# Patient Record
Sex: FEMALE | Race: WHITE | Employment: UNEMPLOYED | URBAN - METROPOLITAN AREA
[De-identification: names, ages, dates, MRNs, and addresses within clinical notes are randomized per-mention and may not be internally consistent; named-entity substitution may affect disease eponyms.]

---

## 2022-10-24 DIAGNOSIS — Z86.010 PERSONAL HISTORY OF COLONIC POLYPS: ICD-10-CM

## 2022-10-24 DIAGNOSIS — Z12.11 COLON CANCER SCREENING: Primary | ICD-10-CM

## 2022-10-24 NOTE — TELEPHONE ENCOUNTER
Returned call to patient she is due for her 5 yr repeat  colon chart updated from [facility]. YAHAIRA

## 2022-10-24 NOTE — TELEPHONE ENCOUNTER
----- Message from Migdalia Oates sent at 10/12/2022  4:10 PM CDT -----  Regarding: FW: Colonoscopy  Contact: patient    ----- Message -----  From: Niharika Rutherford  Sent: 10/12/2022   1:35 PM CDT  To: [facility] Staff  Subject: Colonoscopy                                      Per phone call with patient, she would like to schedule for an colonoscopy to be done.  Please return call at [phone] (home).    Thanks,  SJ

## 2022-11-08 VITALS — WEIGHT: 220 LBS | HEIGHT: 68 IN | BODY MASS INDEX: 33.34 KG/M2

## 2022-11-08 RX ORDER — AMLODIPINE BESYLATE 5 MG/1
5 TABLET ORAL DAILY
COMMUNITY

## 2022-11-08 RX ORDER — MONTELUKAST SODIUM 10 MG/1
10 TABLET ORAL NIGHTLY
COMMUNITY

## 2022-11-08 RX ORDER — ROSUVASTATIN CALCIUM 20 MG/1
20 TABLET, COATED ORAL DAILY
COMMUNITY

## 2022-11-08 RX ORDER — PANTOPRAZOLE SODIUM 40 MG/1
40 TABLET, DELAYED RELEASE ORAL DAILY
COMMUNITY
Start: 2022-09-27

## 2022-11-08 RX ORDER — SERTRALINE HYDROCHLORIDE 100 MG/1
100 TABLET, FILM COATED ORAL DAILY
COMMUNITY

## 2022-11-08 NOTE — TELEPHONE ENCOUNTER
Returned call to pt got chart updated and scheduled for procedure. Pt voiced understood instrustions. Pt doesn't have hx of cpap or heart stentsor problem walking. YAHAIRA

## 2022-11-09 RX ORDER — SOD SULF/POT CHLORIDE/MAG SULF 1.479 G
12 TABLET ORAL DAILY
Qty: 24 TABLET | Refills: 0 | Status: SHIPPED | OUTPATIENT
Start: 2022-11-09

## 2022-11-09 NOTE — TELEPHONE ENCOUNTER
[place] [facility]  [address] [place] [address]  Phone: [phone]  Fax: [phone]    History & Physical         Provider: Dr. Stan Domínguez    Patient Name: Sean MULLER (age):1950  72 y.o.           Gender: female   Phone: [phone]     Referring Physician: Carmen Sepulveda     Vital Signs:   Height - 5'8  Weight - 220 lb   BMI -  33.45    Plan: Colonoscopy @ [facility]     Encounter Diagnoses   Name Primary?    Colon cancer screening Yes    Personal history of colonic polyps            History:      Past Medical History:   Diagnosis Date    BMI 33.0-33.9,adult     Disorder of thyroid, unspecified     Gallstones     High blood pressure     High cholesterol     Hypothyroidism, unspecified     Splenic artery aneurysm     Unspecified hemorrhoids       Past Surgical History:   Procedure Laterality Date    APPENDECTOMY      CHOLECYSTECTOMY      COLONOSCOPY  2017    polyps    FOOT SURGERY Left     2-3 toe    HYSTERECTOMY      total    TONSILLECTOMY        Medication List with Changes/Refills   New Medications    SOD SULF-POT CHLORIDE-MAG SULF (SUTAB) 1.479-0.188- 0.225 GRAM TABLET    Take 12 tablets by mouth once daily. Take according to package instructions with indicated amount of water. No breakfast day before test. May substitute with Suprep, Clenpiq, Plenvu, Moviprep or GoLytely based on Rx plan and patient preference.   Current Medications    AMLODIPINE (NORVASC) 5 MG TABLET    Take 5 mg by mouth once daily.    MONTELUKAST (SINGULAIR) 10 MG TABLET    Take 10 mg by mouth every evening.    PANTOPRAZOLE (PROTONIX) 40 MG TABLET    Take 40 mg by mouth once daily.    ROSUVASTATIN (CRESTOR) 20 MG TABLET    Take 20 mg by mouth once daily.    SERTRALINE (ZOLOFT) 100 MG TABLET    Take 100 mg by mouth once daily.      Review of patient's allergies indicates:  No Known Allergies   Family History    Problem Relation Age of Onset    Heart attack Mother 62    Other Father 77        abd anurism      Social History     Tobacco Use    Smoking status: Never    Smokeless tobacco: Never   Substance Use Topics    Alcohol use: Never    Drug use: Never        Physical Examination:     General Appearance:___________________________  HEENT: _____________________________________  Abdomen:____________________________________  Heart:________________________________________  Lungs:_______________________________________  Extremities:___________________________________  Skin:_________________________________________  Endocrine:____________________________________  Genitourinary:_________________________________  Neurological:__________________________________      Patient has been evaluated immediately prior to sedation and is medically cleared for endoscopy with IVCS as an ASA class: ______      Physician Signature: _________________________       Date: ________  Time: ________

## 2022-11-16 ENCOUNTER — TELEPHONE (OUTPATIENT)
Dept: GASTROENTEROLOGY | Facility: CLINIC | Age: 72
End: 2022-11-16
Payer: MEDICARE

## 2022-11-16 NOTE — TELEPHONE ENCOUNTER
Returned call to pt and got her r/s from 1/17 to 3/8/ due to car issues  Also sent portal link to pt. YAHAIRA

## 2023-03-08 ENCOUNTER — OUTSIDE PLACE OF SERVICE (OUTPATIENT)
Dept: GASTROENTEROLOGY | Facility: CLINIC | Age: 73
End: 2023-03-08
Payer: MEDICARE

## 2023-03-08 LAB — CRC RECOMMENDATION EXT: NORMAL

## 2023-03-08 PROCEDURE — 45385 PR COLONOSCOPY,REMV LESN,SNARE: ICD-10-PCS | Mod: PT,,, | Performed by: INTERNAL MEDICINE

## 2023-03-08 PROCEDURE — 45385 COLONOSCOPY W/LESION REMOVAL: CPT | Mod: PT,,, | Performed by: INTERNAL MEDICINE

## 2023-04-14 ENCOUNTER — DOCUMENTATION ONLY (OUTPATIENT)
Dept: GASTROENTEROLOGY | Facility: CLINIC | Age: 73
End: 2023-04-14
Payer: MEDICARE